# Patient Record
(demographics unavailable — no encounter records)

---

## 2024-11-05 NOTE — HISTORY OF PRESENT ILLNESS
[de-identified] : 60 year old female hx CRS presents for follow up [facility] 9/25/24 Sinus culture without growth  Ongoing constant nasal congestion, frequent PND  Thick yellow anterior rhinorrhea R>L Intermittent right facial pressure Using Ipratropium & plain saline rinses daily Occasional blood tinged drainage with rinsing  Sense of smell and taste slightly improved since stopping steroid/abx rinses

## 2024-11-10 NOTE — PHYSICAL EXAM
[de-identified] : Right wrist: Range of motion without notable pain or joint line tenderness. Basal joint: Joint line tenderness. No swelling, no redness no warmth.   No crepitus with range of motion or stress testing joint line tenderness recreates patient complaint Right thumb MP joint Good stability, no pain with stress test Pain with joint line tenderness.   Right thumb: A1 pulley markedly tender. Active triggering is demonstrated.  This is painful and recreates patient primary complaint. No visible or palpable synovitis.  No redness or warmth No pertinent MP, PIP, or DIP joint contributory findings.  Left hand Basal joint: Joint line tenderness. No swelling, no redness no warmth.  No crepitus with range of motion or stress testing joint line tenderness recreates patient complaint Right thumb MP joint Good stability, no pain with stress test Pain with joint line tenderness. No visible or palpable synovitis.  No redness or warmth No pertinent MP, PIP, or DIP joint contributory findings.  Neurologic Baseline paresthesias reportedly related to underlying connective tissue disease affecting peripheral nerves Phalen's test with median nerve compression: Does not seem to exacerbate paresthesias in either hand.  Skin: few scattered psoriatic lesions. No cyanosis, clubbing, edema or rashes. Vascular: Radial pulses intact. Lymphatic: No streaking or epitrochlear adenopathy. The patient is awake, alert, and oriented. Affect appropriate. Cooperative.

## 2024-11-10 NOTE — PROCEDURE
[] : The injection was done in 2 phases with the first phase being subcutaneous injection of lidocaine 1.5 cc subcutaneously as anesthetic. When adequate level of anesthesia was achieved, the Celestone injection was undertaken. [Thumb] : thumb [FreeTextEntry1] : KENALOG 40 mg injected each thumb.

## 2024-11-10 NOTE — PHYSICAL EXAM
[de-identified] : Right wrist: Range of motion without notable pain or joint line tenderness. Basal joint: Joint line tenderness. No swelling, no redness no warmth.   No crepitus with range of motion or stress testing joint line tenderness recreates patient complaint Right thumb MP joint Good stability, no pain with stress test Pain with joint line tenderness.   Right thumb: A1 pulley markedly tender. Active triggering is demonstrated.  This is painful and recreates patient primary complaint. No visible or palpable synovitis.  No redness or warmth No pertinent MP, PIP, or DIP joint contributory findings.  Left hand Basal joint: Joint line tenderness. No swelling, no redness no warmth.  No crepitus with range of motion or stress testing joint line tenderness recreates patient complaint Right thumb MP joint Good stability, no pain with stress test Pain with joint line tenderness. No visible or palpable synovitis.  No redness or warmth No pertinent MP, PIP, or DIP joint contributory findings.  Neurologic Baseline paresthesias reportedly related to underlying connective tissue disease affecting peripheral nerves Phalen's test with median nerve compression: Does not seem to exacerbate paresthesias in either hand.  Skin: few scattered psoriatic lesions. No cyanosis, clubbing, edema or rashes. Vascular: Radial pulses intact. Lymphatic: No streaking or epitrochlear adenopathy. The patient is awake, alert, and oriented. Affect appropriate. Cooperative.

## 2024-11-10 NOTE — HISTORY OF PRESENT ILLNESS
[FreeTextEntry1] : The patient is 59 yo RHD female retired , [employer], [place], with history of small fiber neuropathy. Most recent visit 7/3/2024.  Past hand history: 1. R3 trigger release 9/25/2020 2. L3 trigger release 9/25/2020 3. L thumb cortisone injection 9/25/2020 4. Rt CTR 2010  Medical history: Psoriatic arthritis, Sjogren's, small fiber neuropathy, vasculitis Rheumatologist: Carlie York MD; previously [facility], now pvt practice. Pain mgt and neurologist: both at [facility]; PM&R: [facility]  At the previous visit: Bilateral thumb pain primarily basal joint but also MP joints.   Radiographs did not show any notable arthritic change in these joints.   Radiographs showed minimal marginal osteophytes and several small joints that might indicate mild osteoarthritis.  TODAY: The patient presents as a RETURN patient for hand evaluation. NEW PROBLEM, different from prior problems: The patient describes triggering of the right thumb with considerable pain which began approximately 1 month ago and has gotten progressively worse. Patient has similar pain on the left without overt triggering but the pain is similar in location and nature. Patient has awareness of basal joint pain but basal joints are not primary problem today. In retrospect, the pain the patient had at MP joints when she was seen in July 2024 may have actually been early onset FPL tendinitis. Patient has no other notable hand complaints.

## 2024-11-10 NOTE — HISTORY OF PRESENT ILLNESS
[FreeTextEntry1] : The patient is 61 yo RHD female retired , [employer], [place], with history of small fiber neuropathy. Most recent visit 7/3/2024.  Past hand history: 1. R3 trigger release 9/25/2020 2. L3 trigger release 9/25/2020 3. L thumb cortisone injection 9/25/2020 4. Rt CTR 2010  Medical history: Psoriatic arthritis, Sjogren's, small fiber neuropathy, vasculitis Rheumatologist: Carlie York MD; previously [facility], now pvt practice. Pain mgt and neurologist: both at [facility]; PM&R: [facility]  At the previous visit: Bilateral thumb pain primarily basal joint but also MP joints.   Radiographs did not show any notable arthritic change in these joints.   Radiographs showed minimal marginal osteophytes and several small joints that might indicate mild osteoarthritis.  TODAY: The patient presents as a RETURN patient for hand evaluation. NEW PROBLEM, different from prior problems: The patient describes triggering of the right thumb with considerable pain which began approximately 1 month ago and has gotten progressively worse. Patient has similar pain on the left without overt triggering but the pain is similar in location and nature. Patient has awareness of basal joint pain but basal joints are not primary problem today. In retrospect, the pain the patient had at MP joints when she was seen in July 2024 may have actually been early onset FPL tendinitis. Patient has no other notable hand complaints.

## 2024-11-10 NOTE — PHYSICAL EXAM
[de-identified] : Right wrist: Range of motion without notable pain or joint line tenderness. Basal joint: Joint line tenderness. No swelling, no redness no warmth.   No crepitus with range of motion or stress testing joint line tenderness recreates patient complaint Right thumb MP joint Good stability, no pain with stress test Pain with joint line tenderness.   Right thumb: A1 pulley markedly tender. Active triggering is demonstrated.  This is painful and recreates patient primary complaint. No visible or palpable synovitis.  No redness or warmth No pertinent MP, PIP, or DIP joint contributory findings.  Left hand Basal joint: Joint line tenderness. No swelling, no redness no warmth.  No crepitus with range of motion or stress testing joint line tenderness recreates patient complaint Right thumb MP joint Good stability, no pain with stress test Pain with joint line tenderness. No visible or palpable synovitis.  No redness or warmth No pertinent MP, PIP, or DIP joint contributory findings.  Neurologic Baseline paresthesias reportedly related to underlying connective tissue disease affecting peripheral nerves Phalen's test with median nerve compression: Does not seem to exacerbate paresthesias in either hand.  Skin: few scattered psoriatic lesions. No cyanosis, clubbing, edema or rashes. Vascular: Radial pulses intact. Lymphatic: No streaking or epitrochlear adenopathy. The patient is awake, alert, and oriented. Affect appropriate. Cooperative.

## 2024-11-10 NOTE — ASSESSMENT
[FreeTextEntry1] : Patient's primary problems are trigger finger of both thumbs.  Right is more symptomatic than left.  Patient had pain in basal joint and MP joints at the prior visit.  Basal joints are not symptomatic today.  MP joints are not notably symptomatic.  Today patient has thumb A1 pulley tenderness and active triggering on the right and mild triggering on the left.  I have reviewed treatment options risks and complications with patient.  I recommend cortisone injections for both thumbs because not providing treatment is likely to result in progression of trigger fingers which would then be more difficult to treat in the future if not treated now. Given the underlying connective tissue disorder, the prognosis for improvement is limited.  However, in my opinion the prognosis is better with cortisone injection than without, which patient accepted after discussion.  Patient has other active hand problems but none require intervention. Prognosis uncertain. Patient should return if triggering continues or if triggering recurs. Patient should return if other problems progress or develop. Otherwise, return as needed.  The following post-injection instructions were given to the patient: The patient should be cautious in activities for two weeks and then increase to full activities.  Thereafter, the patient should return if symptoms continue, or if they hossein and recur subsequently. If symptoms do not recur, the patient need not return and can be seen on an as needed basis. The patient has expressed understanding and acceptance of analysis, treatment, and recommendations.  All questions answered.

## 2024-11-10 NOTE — HISTORY OF PRESENT ILLNESS
[FreeTextEntry1] : The patient is 59 yo RHD female retired , [employer], [place], with history of small fiber neuropathy. Most recent visit 7/3/2024.  Past hand history: 1. R3 trigger release 9/25/2020 2. L3 trigger release 9/25/2020 3. L thumb cortisone injection 9/25/2020 4. Rt CTR 2010  Medical history: Psoriatic arthritis, Sjogren's, small fiber neuropathy, vasculitis Rheumatologist: Carlie York MD; previously [facility], now pvt practice. Pain mgt and neurologist: both at [facility]; PM&R: [facility]  At the previous visit: Bilateral thumb pain primarily basal joint but also MP joints.   Radiographs did not show any notable arthritic change in these joints.   Radiographs showed minimal marginal osteophytes and several small joints that might indicate mild osteoarthritis.  TODAY: The patient presents as a RETURN patient for hand evaluation. NEW PROBLEM, different from prior problems: The patient describes triggering of the right thumb with considerable pain which began approximately 1 month ago and has gotten progressively worse. Patient has similar pain on the left without overt triggering but the pain is similar in location and nature. Patient has awareness of basal joint pain but basal joints are not primary problem today. In retrospect, the pain the patient had at MP joints when she was seen in July 2024 may have actually been early onset FPL tendinitis. Patient has no other notable hand complaints.

## 2025-02-28 NOTE — HISTORY OF PRESENT ILLNESS
[FreeTextEntry1] : The patient is 61 yo RHD female retired , [employer], [place], with history of small fiber neuropathy. Most recent visit 11/7/2024  Past hand history: 1. R3 trigger release 9/25/2020 2. L3 trigger release 9/25/2020 3. L thumb trigger injection 9/25/2020, Kenalog 40 mg 11/7/2024 4.  Right thumb trigger injection Kenalog 40 mg 11/7/2024. 5.  Rt CTR 2010  Medical history: Psoriatic arthritis, Sjogren's, small fiber neuropathy, vasculitis Rheumatologist: Carlie York MD; previously [facility], now pvt practice. Pain mgt and neurologist: both at [facility]; PM&R: [facility]  TODAY: The patient presents as a RETURN patient for hand evaluation. The patient returns because of recurrence of symptoms in both thumbs. Left thumb: Patient states that she had possibly 1 month relief and then "it started to come back".   Left thumb is more painful than right thumb.  The left thumb is "starting to" click.  Does not get locked in flexion. Right thumb: Was all better following the injection.  Symptoms started to recur 1 to 2 weeks ago on the right and symptoms are much less than the left.  Patient not consciously aware of triggering in the right thumb, but there is tenderness at A1 pulley. Patient is not aware of other trigger fingers. Patient states that because of small fiber neuropathy she has ongoing numbness and tingling. Patient states that symptoms of numbness and tingling are not worse at night or when holding objects as might occur with carpal tunnel syndrome. Patient has no other active hand problem for which she seeks treatment today.

## 2025-02-28 NOTE — HISTORY OF PRESENT ILLNESS
[FreeTextEntry1] : The patient is 59 yo RHD female retired , [employer], [place], with history of small fiber neuropathy. Most recent visit 11/7/2024  Past hand history: 1. R3 trigger release 9/25/2020 2. L3 trigger release 9/25/2020 3. L thumb trigger injection 9/25/2020, Kenalog 40 mg 11/7/2024 4.  Right thumb trigger injection Kenalog 40 mg 11/7/2024. 5.  Rt CTR 2010  Medical history: Psoriatic arthritis, Sjogren's, small fiber neuropathy, vasculitis Rheumatologist: Carlie York MD; previously [facility], now pvt practice. Pain mgt and neurologist: both at [facility]; PM&R: [facility]  TODAY: The patient presents as a RETURN patient for hand evaluation. The patient returns because of recurrence of symptoms in both thumbs. Left thumb: Patient states that she had possibly 1 month relief and then "it started to come back".   Left thumb is more painful than right thumb.  The left thumb is "starting to" click.  Does not get locked in flexion. Right thumb: Was all better following the injection.  Symptoms started to recur 1 to 2 weeks ago on the right and symptoms are much less than the left.  Patient not consciously aware of triggering in the right thumb, but there is tenderness at A1 pulley. Patient is not aware of other trigger fingers. Patient states that because of small fiber neuropathy she has ongoing numbness and tingling. Patient states that symptoms of numbness and tingling are not worse at night or when holding objects as might occur with carpal tunnel syndrome. Patient has no other active hand problem for which she seeks treatment today.

## 2025-02-28 NOTE — ASSESSMENT
[FreeTextEntry1] : Patient has recurrence of bilateral thumb pain.  Although there is some pain in MP joint pain is much more notable over A1 pulleys of both thumbs.  There is very subtle triggering in both thumbs.  Patient had been treated with cortisone injections for this condition November 2024 and states that she was relieved of symptoms until 1 month ago.  The relatively rapid recurrence is a poor prognostic indicator.  Surgical release of A1 pulley most likely would be the treatment that would provide the patient the best chance for the most amount of improvement.  I am concerned that the patient's underlying connective tissue disorder is a contributing factor to the thumb pain even if A1 pulleys were to be surgically released.  If so the patient would likely have some ongoing thumb pain.  I discussed pros and cons of trigger release surgery with patient in context with the above.  Patient explains that because patient's  had recent back surgery and because he requires her assistance, patient states that she is unwilling and unable to have trigger release surgery at this time.  Even though the prognosis for the resolution of thumb pain and triggering is low, it is likely the patient will at least have temporary relief.  Consequently, cortisone injection for both trigger thumbs indicated and administered. If patient has excellent response and full relief of symptoms this response would confirm trigger finger.  Subsequently, if there is recurrence, then surgery would be indicated and recommended.  Surgery has been discussed.  Assuming both thumbs become symptomatic again bilateral thumb trigger release will be performed at the same surgery.  Patient instructed to call office and speak to surgery coordinator to arrange the surgery.  Revisit prior to surgery is recommended if the patient has any questions, concerns or if she notes any changes.  Prognosis uncertain. Patient agrees to return if her symptoms continue or recur.   Tentatively, trigger release surgery would be indicated and recommended if the patient has complete relief following these injections and then a recurrence.  A lengthy and detailed discussion was held with the patient regarding analysis, treatment, and recommendations. All questions have been answered. At the conclusion the patient expressed acceptance, understanding and agreement with the plan.

## 2025-02-28 NOTE — PHYSICAL EXAM
[de-identified] : Right wrist: Range of motion without notable pain or joint line tenderness. Basal joint: Joint line tenderness. No swelling, no redness no warmth.   No crepitus with range of motion or stress testing joint line tenderness recreates patient complaint Right hand: Right thumb MP joint: Good stability, no pain with stress test  thumb MP joint: Minimal joint line tenderness.   Right thumb: A1 pulley markedly tender. very subtle triggering is demonstrated.  This is painful and recreates patient primary complaint.   There is no other A1 pulley tenderness or triggering in any other finger, right hand. No visible or palpable synovitis.  No redness or warmth No pertinent MP, PIP, or DIP joint contributory findings.  Left hand Basal joint: Joint line tenderness. No swelling, no redness no warmth.   No crepitus with range of motion or stress testing joint line tenderness recreates patient complaint Right thumb MP joint Good stability, no pain with stress test No notable pain or joint line tenderness. No visible or palpable synovitis.  No redness or warmth Thumb A1 pulley is tender. Subtle triggering is demonstrated which is painful.   A1 pulley tenderness and pain with subtle triggering recreate patient primary complaint in the left thumb No pertinent MP, PIP, or DIP joint contributory findings.  Neurologic Baseline paresthesias reportedly related to underlying connective tissue disease and small fiber neuropathy affecting peripheral nerves Phalen's test with median nerve compression: Does not seem to exacerbate paresthesias in either hand.  Skin: few scattered psoriatic lesions. No cyanosis, clubbing, edema or rashes. Vascular: Radial pulses intact. Lymphatic: No streaking or epitrochlear adenopathy. The patient is awake, alert, and oriented. Affect appropriate. Cooperative.

## 2025-02-28 NOTE — PHYSICAL EXAM
[de-identified] : Right wrist: Range of motion without notable pain or joint line tenderness. Basal joint: Joint line tenderness. No swelling, no redness no warmth.   No crepitus with range of motion or stress testing joint line tenderness recreates patient complaint Right hand: Right thumb MP joint: Good stability, no pain with stress test  thumb MP joint: Minimal joint line tenderness.   Right thumb: A1 pulley markedly tender. very subtle triggering is demonstrated.  This is painful and recreates patient primary complaint.   There is no other A1 pulley tenderness or triggering in any other finger, right hand. No visible or palpable synovitis.  No redness or warmth No pertinent MP, PIP, or DIP joint contributory findings.  Left hand Basal joint: Joint line tenderness. No swelling, no redness no warmth.   No crepitus with range of motion or stress testing joint line tenderness recreates patient complaint Right thumb MP joint Good stability, no pain with stress test No notable pain or joint line tenderness. No visible or palpable synovitis.  No redness or warmth Thumb A1 pulley is tender. Subtle triggering is demonstrated which is painful.   A1 pulley tenderness and pain with subtle triggering recreate patient primary complaint in the left thumb No pertinent MP, PIP, or DIP joint contributory findings.  Neurologic Baseline paresthesias reportedly related to underlying connective tissue disease and small fiber neuropathy affecting peripheral nerves Phalen's test with median nerve compression: Does not seem to exacerbate paresthesias in either hand.  Skin: few scattered psoriatic lesions. No cyanosis, clubbing, edema or rashes. Vascular: Radial pulses intact. Lymphatic: No streaking or epitrochlear adenopathy. The patient is awake, alert, and oriented. Affect appropriate. Cooperative.

## 2025-02-28 NOTE — HISTORY OF PRESENT ILLNESS
[FreeTextEntry1] : The patient is 61 yo RHD female retired , [employer], [place], with history of small fiber neuropathy. Most recent visit 11/7/2024  Past hand history: 1. R3 trigger release 9/25/2020 2. L3 trigger release 9/25/2020 3. L thumb trigger injection 9/25/2020, Kenalog 40 mg 11/7/2024 4.  Right thumb trigger injection Kenalog 40 mg 11/7/2024. 5.  Rt CTR 2010  Medical history: Psoriatic arthritis, Sjogren's, small fiber neuropathy, vasculitis Rheumatologist: Carlie York MD; previously [facility], now pvt practice. Pain mgt and neurologist: both at [facility]; PM&R: [facility]  TODAY: The patient presents as a RETURN patient for hand evaluation. The patient returns because of recurrence of symptoms in both thumbs. Left thumb: Patient states that she had possibly 1 month relief and then "it started to come back".   Left thumb is more painful than right thumb.  The left thumb is "starting to" click.  Does not get locked in flexion. Right thumb: Was all better following the injection.  Symptoms started to recur 1 to 2 weeks ago on the right and symptoms are much less than the left.  Patient not consciously aware of triggering in the right thumb, but there is tenderness at A1 pulley. Patient is not aware of other trigger fingers. Patient states that because of small fiber neuropathy she has ongoing numbness and tingling. Patient states that symptoms of numbness and tingling are not worse at night or when holding objects as might occur with carpal tunnel syndrome. Patient has no other active hand problem for which she seeks treatment today.

## 2025-02-28 NOTE — PHYSICAL EXAM
[de-identified] : Right wrist: Range of motion without notable pain or joint line tenderness. Basal joint: Joint line tenderness. No swelling, no redness no warmth.   No crepitus with range of motion or stress testing joint line tenderness recreates patient complaint Right hand: Right thumb MP joint: Good stability, no pain with stress test  thumb MP joint: Minimal joint line tenderness.   Right thumb: A1 pulley markedly tender. very subtle triggering is demonstrated.  This is painful and recreates patient primary complaint.   There is no other A1 pulley tenderness or triggering in any other finger, right hand. No visible or palpable synovitis.  No redness or warmth No pertinent MP, PIP, or DIP joint contributory findings.  Left hand Basal joint: Joint line tenderness. No swelling, no redness no warmth.   No crepitus with range of motion or stress testing joint line tenderness recreates patient complaint Right thumb MP joint Good stability, no pain with stress test No notable pain or joint line tenderness. No visible or palpable synovitis.  No redness or warmth Thumb A1 pulley is tender. Subtle triggering is demonstrated which is painful.   A1 pulley tenderness and pain with subtle triggering recreate patient primary complaint in the left thumb No pertinent MP, PIP, or DIP joint contributory findings.  Neurologic Baseline paresthesias reportedly related to underlying connective tissue disease and small fiber neuropathy affecting peripheral nerves Phalen's test with median nerve compression: Does not seem to exacerbate paresthesias in either hand.  Skin: few scattered psoriatic lesions. No cyanosis, clubbing, edema or rashes. Vascular: Radial pulses intact. Lymphatic: No streaking or epitrochlear adenopathy. The patient is awake, alert, and oriented. Affect appropriate. Cooperative.

## 2025-02-28 NOTE — PROCEDURE
[] : The injection was done in 2 phases with the first phase being subcutaneous injection of lidocaine 1.5 cc subcutaneously as anesthetic. When adequate level of anesthesia was achieved, the Celestone injection was undertaken. [Thumb] : thumb [FreeTextEntry1] : Kenalog 40 mg injected bilat;  NO celestone injected

## 2025-04-11 NOTE — HISTORY OF PRESENT ILLNESS
[de-identified] : 61 year old female hx CRS presents for follow up [facility] 12/11/2024 Recent worsening of sx w congestion, nasal drainage Using rinses